# Patient Record
Sex: FEMALE | Race: WHITE | NOT HISPANIC OR LATINO
[De-identification: names, ages, dates, MRNs, and addresses within clinical notes are randomized per-mention and may not be internally consistent; named-entity substitution may affect disease eponyms.]

---

## 2017-07-20 NOTE — H&P PST ADULT - HISTORY OF PRESENT ILLNESS
36 yo female  LMP 15 presented for elective D&C due to missed  Pt presented to PMD office where sono noted no sac or fetal heart rate

## 2017-07-21 ENCOUNTER — RESULT REVIEW (OUTPATIENT)
Age: 37
End: 2017-07-21

## 2017-07-21 ENCOUNTER — TRANSCRIPTION ENCOUNTER (OUTPATIENT)
Age: 37
End: 2017-07-21

## 2017-07-21 ENCOUNTER — OUTPATIENT (OUTPATIENT)
Dept: OUTPATIENT SERVICES | Facility: HOSPITAL | Age: 37
LOS: 1 days | Discharge: HOME | End: 2017-07-21
Payer: MEDICAID

## 2017-07-21 VITALS
HEART RATE: 77 BPM | RESPIRATION RATE: 16 BRPM | OXYGEN SATURATION: 96 % | SYSTOLIC BLOOD PRESSURE: 100 MMHG | DIASTOLIC BLOOD PRESSURE: 60 MMHG

## 2017-07-21 VITALS
DIASTOLIC BLOOD PRESSURE: 78 MMHG | TEMPERATURE: 98 F | HEART RATE: 69 BPM | WEIGHT: 229.94 LBS | HEIGHT: 66 IN | SYSTOLIC BLOOD PRESSURE: 105 MMHG | OXYGEN SATURATION: 98 % | RESPIRATION RATE: 19 BRPM

## 2017-07-21 DIAGNOSIS — O02.1 MISSED ABORTION: ICD-10-CM

## 2017-07-21 DIAGNOSIS — Z98.89 OTHER SPECIFIED POSTPROCEDURAL STATES: Chronic | ICD-10-CM

## 2017-07-21 DIAGNOSIS — Z87.39 PERSONAL HISTORY OF OTHER DISEASES OF THE MUSCULOSKELETAL SYSTEM AND CONNECTIVE TISSUE: Chronic | ICD-10-CM

## 2017-07-21 DIAGNOSIS — Z98.891 HISTORY OF UTERINE SCAR FROM PREVIOUS SURGERY: Chronic | ICD-10-CM

## 2017-07-21 LAB
BLD GP AB SCN SERPL QL: SIGNIFICANT CHANGE UP
HCT VFR BLD CALC: 40.1 % — SIGNIFICANT CHANGE UP (ref 34.5–45)
HGB BLD-MCNC: 13.5 G/DL — SIGNIFICANT CHANGE UP (ref 11.5–15.5)
MCHC RBC-ENTMCNC: 29.6 PG — SIGNIFICANT CHANGE UP (ref 27–34)
MCHC RBC-ENTMCNC: 33.6 GM/DL — SIGNIFICANT CHANGE UP (ref 32–36)
MCV RBC AUTO: 88.3 FL — SIGNIFICANT CHANGE UP (ref 80–100)
PLATELET # BLD AUTO: 418 K/UL — HIGH (ref 150–400)
RBC # BLD: 4.54 M/UL — SIGNIFICANT CHANGE UP (ref 3.8–5.2)
RBC # FLD: 12.1 % — SIGNIFICANT CHANGE UP (ref 10.3–14.5)
WBC # BLD: 10.6 K/UL — HIGH (ref 3.8–10.5)
WBC # FLD AUTO: 10.6 K/UL — HIGH (ref 3.8–10.5)

## 2017-07-21 PROCEDURE — 88305 TISSUE EXAM BY PATHOLOGIST: CPT

## 2017-07-21 PROCEDURE — 86850 RBC ANTIBODY SCREEN: CPT

## 2017-07-21 PROCEDURE — 88300 SURGICAL PATH GROSS: CPT

## 2017-07-21 PROCEDURE — 88300 SURGICAL PATH GROSS: CPT | Mod: 26,59

## 2017-07-21 PROCEDURE — 86900 BLOOD TYPING SEROLOGIC ABO: CPT

## 2017-07-21 PROCEDURE — 86901 BLOOD TYPING SEROLOGIC RH(D): CPT

## 2017-07-21 PROCEDURE — 59820 CARE OF MISCARRIAGE: CPT

## 2017-07-21 PROCEDURE — 88305 TISSUE EXAM BY PATHOLOGIST: CPT | Mod: 26

## 2017-07-21 PROCEDURE — 85027 COMPLETE CBC AUTOMATED: CPT

## 2017-07-21 RX ORDER — SODIUM CHLORIDE 9 MG/ML
1000 INJECTION, SOLUTION INTRAVENOUS
Qty: 0 | Refills: 0 | Status: DISCONTINUED | OUTPATIENT
Start: 2017-07-21 | End: 2017-07-21

## 2017-07-21 RX ORDER — ACETAMINOPHEN 500 MG
650 TABLET ORAL ONCE
Qty: 0 | Refills: 0 | Status: DISCONTINUED | OUTPATIENT
Start: 2017-07-21 | End: 2017-07-21

## 2017-07-21 RX ORDER — OXYCODONE HYDROCHLORIDE 5 MG/1
5 TABLET ORAL EVERY 4 HOURS
Qty: 0 | Refills: 0 | Status: DISCONTINUED | OUTPATIENT
Start: 2017-07-21 | End: 2017-07-21

## 2017-07-21 RX ORDER — HYDROMORPHONE HYDROCHLORIDE 2 MG/ML
0.5 INJECTION INTRAMUSCULAR; INTRAVENOUS; SUBCUTANEOUS
Qty: 0 | Refills: 0 | Status: DISCONTINUED | OUTPATIENT
Start: 2017-07-21 | End: 2017-07-21

## 2017-07-21 RX ADMIN — HYDROMORPHONE HYDROCHLORIDE 0.5 MILLIGRAM(S): 2 INJECTION INTRAMUSCULAR; INTRAVENOUS; SUBCUTANEOUS at 14:07

## 2017-07-21 RX ADMIN — HYDROMORPHONE HYDROCHLORIDE 0.5 MILLIGRAM(S): 2 INJECTION INTRAMUSCULAR; INTRAVENOUS; SUBCUTANEOUS at 12:57

## 2017-07-21 NOTE — ASU DISCHARGE PLAN (ADULT/PEDIATRIC). - NOTIFY
Bleeding that does not stop/Inability to Tolerate Liquids or Foods/Pain not relieved by Medications/Persistent Nausea and Vomiting/Unable to Urinate/Excessive Diarrhea/GYN Fever>100.4

## 2017-07-21 NOTE — BRIEF OPERATIVE NOTE - PROCEDURE
Karyotyping of products of conception (nominal result)  2017    Active  [id number]  D&C, therapeutic, for incomp, missed, septic, or induced , 1st trimester  2017    [facility]  [facility]

## 2017-07-21 NOTE — ASU DISCHARGE PLAN (ADULT/PEDIATRIC). - INSTRUCTIONS
****Call the office with any problems including but not limited to heavy vaginal bleeding, fevers, severe abdominal pain, inability to eat/drink/urinate  **** Nothing in the vagina x2 weeks-( No sex, tampons, douching )  *****You may shower as usual but no hot tubs, bath tubs, swimming pools x2 weeks.  for urgent post op care please contact Ayaka at Dr. Isaacs's surgical hotline  [phone]

## 2017-07-21 NOTE — ASU DISCHARGE PLAN (ADULT/PEDIATRIC). - MEDICATION SUMMARY - MEDICATIONS TO TAKE
I will START or STAY ON the medications listed below when I get home from the hospital:    ibuprofen 200 mg oral tablet  -- 3 tab(s) by mouth every 6 hours, As Needed  -- Indication: For pain med    naproxen 250 mg oral tablet  -- 1 tab(s) by mouth 2 times a day, As Needed  -- Indication: For pain med    Prenatal 1 oral capsule  -- 1 tab(s) by mouth once a day  -- Indication: For Home med

## 2017-07-21 NOTE — ASU DISCHARGE PLAN (ADULT/PEDIATRIC). - ACTIVITY LEVEL
no heavy lifting/no sports/gym/no exercise/no tampons/no intercourse/no douching/no tub baths/nothing per vagina

## 2017-07-24 LAB
CHROM ANALY OVERALL INTERP SPEC-IMP: SIGNIFICANT CHANGE UP
SURGICAL PATHOLOGY FINAL REPORT - CH: SIGNIFICANT CHANGE UP

## 2017-07-25 DIAGNOSIS — O02.1 MISSED ABORTION: ICD-10-CM

## 2017-07-25 DIAGNOSIS — Z87.891 PERSONAL HISTORY OF NICOTINE DEPENDENCE: ICD-10-CM

## 2017-07-25 DIAGNOSIS — E66.9 OBESITY, UNSPECIFIED: ICD-10-CM

## 2017-11-15 ENCOUNTER — APPOINTMENT (OUTPATIENT)
Dept: ANTEPARTUM | Facility: CLINIC | Age: 37
End: 2017-11-15
Payer: MEDICAID

## 2017-11-15 ENCOUNTER — ASOB RESULT (OUTPATIENT)
Age: 37
End: 2017-11-15

## 2017-11-15 PROCEDURE — 36416 COLLJ CAPILLARY BLOOD SPEC: CPT

## 2017-11-15 PROCEDURE — 76801 OB US < 14 WKS SINGLE FETUS: CPT

## 2017-11-15 PROCEDURE — 76813 OB US NUCHAL MEAS 1 GEST: CPT

## 2018-01-09 ENCOUNTER — APPOINTMENT (OUTPATIENT)
Dept: ANTEPARTUM | Facility: CLINIC | Age: 38
End: 2018-01-09
Payer: MEDICAID

## 2018-01-09 ENCOUNTER — ASOB RESULT (OUTPATIENT)
Age: 38
End: 2018-01-09

## 2018-01-09 PROCEDURE — 99241 OFFICE CONSULTATION NEW/ESTAB PATIENT 15 MIN: CPT | Mod: 25

## 2018-01-09 PROCEDURE — 76811 OB US DETAILED SNGL FETUS: CPT

## 2018-01-19 ENCOUNTER — APPOINTMENT (OUTPATIENT)
Dept: ANTEPARTUM | Facility: CLINIC | Age: 38
End: 2018-01-19

## 2018-05-01 ENCOUNTER — TRANSCRIPTION ENCOUNTER (OUTPATIENT)
Age: 38
End: 2018-05-01

## 2018-05-02 ENCOUNTER — TRANSCRIPTION ENCOUNTER (OUTPATIENT)
Age: 38
End: 2018-05-02

## 2018-05-02 ENCOUNTER — INPATIENT (INPATIENT)
Facility: HOSPITAL | Age: 38
LOS: 2 days | Discharge: HOME | End: 2018-05-05
Attending: OBSTETRICS & GYNECOLOGY | Admitting: OBSTETRICS & GYNECOLOGY
Payer: MEDICAID

## 2018-05-02 ENCOUNTER — RESULT REVIEW (OUTPATIENT)
Age: 38
End: 2018-05-02

## 2018-05-02 VITALS — WEIGHT: 259.93 LBS | HEIGHT: 66 IN

## 2018-05-02 DIAGNOSIS — Z98.89 OTHER SPECIFIED POSTPROCEDURAL STATES: Chronic | ICD-10-CM

## 2018-05-02 DIAGNOSIS — Z87.39 PERSONAL HISTORY OF OTHER DISEASES OF THE MUSCULOSKELETAL SYSTEM AND CONNECTIVE TISSUE: Chronic | ICD-10-CM

## 2018-05-02 DIAGNOSIS — Z3A.00 WEEKS OF GESTATION OF PREGNANCY NOT SPECIFIED: ICD-10-CM

## 2018-05-02 DIAGNOSIS — O42.10 PREMATURE RUPTURE OF MEMBRANES, ONSET OF LABOR MORE THAN 24 HOURS FOLLOWING RUPTURE, UNSPECIFIED WEEKS OF GESTATION: ICD-10-CM

## 2018-05-02 DIAGNOSIS — Z98.891 HISTORY OF UTERINE SCAR FROM PREVIOUS SURGERY: Chronic | ICD-10-CM

## 2018-05-02 LAB
BLD GP AB SCN SERPL QL: NEGATIVE — SIGNIFICANT CHANGE UP
HCT VFR BLD CALC: 34.7 % — SIGNIFICANT CHANGE UP (ref 34.5–45)
HGB BLD-MCNC: 11 G/DL — LOW (ref 11.5–15.5)
MCHC RBC-ENTMCNC: 28.6 PG — SIGNIFICANT CHANGE UP (ref 27–34)
MCHC RBC-ENTMCNC: 31.7 % — LOW (ref 32–36)
MCV RBC AUTO: 90.4 FL — SIGNIFICANT CHANGE UP (ref 80–100)
NRBC # FLD: 0 — SIGNIFICANT CHANGE UP
PLATELET # BLD AUTO: 297 K/UL — SIGNIFICANT CHANGE UP (ref 150–400)
PMV BLD: 11 FL — SIGNIFICANT CHANGE UP (ref 7–13)
RBC # BLD: 3.84 M/UL — SIGNIFICANT CHANGE UP (ref 3.8–5.2)
RBC # FLD: 14.3 % — SIGNIFICANT CHANGE UP (ref 10.3–14.5)
RH IG SCN BLD-IMP: POSITIVE — SIGNIFICANT CHANGE UP
T PALLIDUM AB TITR SER: NEGATIVE — SIGNIFICANT CHANGE UP
WBC # BLD: 13.61 K/UL — HIGH (ref 3.8–10.5)
WBC # FLD AUTO: 13.61 K/UL — HIGH (ref 3.8–10.5)

## 2018-05-02 PROCEDURE — 59514 CESAREAN DELIVERY ONLY: CPT | Mod: AS,U8

## 2018-05-02 PROCEDURE — 88302 TISSUE EXAM BY PATHOLOGIST: CPT | Mod: 26

## 2018-05-02 RX ORDER — GLYCERIN ADULT
1 SUPPOSITORY, RECTAL RECTAL AT BEDTIME
Qty: 0 | Refills: 0 | Status: DISCONTINUED | OUTPATIENT
Start: 2018-05-02 | End: 2018-05-05

## 2018-05-02 RX ORDER — FAMOTIDINE 10 MG/ML
20 INJECTION INTRAVENOUS ONCE
Qty: 0 | Refills: 0 | Status: COMPLETED | OUTPATIENT
Start: 2018-05-02 | End: 2018-05-02

## 2018-05-02 RX ORDER — ACETAMINOPHEN 500 MG
975 TABLET ORAL EVERY 6 HOURS
Qty: 0 | Refills: 0 | Status: DISCONTINUED | OUTPATIENT
Start: 2018-05-02 | End: 2018-05-02

## 2018-05-02 RX ORDER — OXYCODONE HYDROCHLORIDE 5 MG/1
5 TABLET ORAL
Qty: 0 | Refills: 0 | Status: DISCONTINUED | OUTPATIENT
Start: 2018-05-02 | End: 2018-05-03

## 2018-05-02 RX ORDER — HYDROMORPHONE HYDROCHLORIDE 2 MG/ML
1 INJECTION INTRAMUSCULAR; INTRAVENOUS; SUBCUTANEOUS
Qty: 0 | Refills: 0 | Status: DISCONTINUED | OUTPATIENT
Start: 2018-05-02 | End: 2018-05-03

## 2018-05-02 RX ORDER — DOCUSATE SODIUM 100 MG
100 CAPSULE ORAL
Qty: 0 | Refills: 0 | Status: DISCONTINUED | OUTPATIENT
Start: 2018-05-02 | End: 2018-05-05

## 2018-05-02 RX ORDER — OXYTOCIN 10 UNIT/ML
333.33 VIAL (ML) INJECTION
Qty: 20 | Refills: 0 | Status: DISCONTINUED | OUTPATIENT
Start: 2018-05-02 | End: 2018-05-02

## 2018-05-02 RX ORDER — SODIUM CHLORIDE 9 MG/ML
1000 INJECTION, SOLUTION INTRAVENOUS
Qty: 0 | Refills: 0 | Status: DISCONTINUED | OUTPATIENT
Start: 2018-05-02 | End: 2018-05-03

## 2018-05-02 RX ORDER — SIMETHICONE 80 MG/1
80 TABLET, CHEWABLE ORAL EVERY 4 HOURS
Qty: 0 | Refills: 0 | Status: DISCONTINUED | OUTPATIENT
Start: 2018-05-02 | End: 2018-05-05

## 2018-05-02 RX ORDER — OXYTOCIN 10 UNIT/ML
41.67 VIAL (ML) INJECTION
Qty: 20 | Refills: 0 | Status: DISCONTINUED | OUTPATIENT
Start: 2018-05-02 | End: 2018-05-02

## 2018-05-02 RX ORDER — KETOROLAC TROMETHAMINE 30 MG/ML
30 SYRINGE (ML) INJECTION EVERY 6 HOURS
Qty: 0 | Refills: 0 | Status: DISCONTINUED | OUTPATIENT
Start: 2018-05-02 | End: 2018-05-03

## 2018-05-02 RX ORDER — LANOLIN
1 OINTMENT (GRAM) TOPICAL
Qty: 0 | Refills: 0 | Status: DISCONTINUED | OUTPATIENT
Start: 2018-05-02 | End: 2018-05-05

## 2018-05-02 RX ORDER — FERROUS SULFATE 325(65) MG
325 TABLET ORAL DAILY
Qty: 0 | Refills: 0 | Status: DISCONTINUED | OUTPATIENT
Start: 2018-05-02 | End: 2018-05-05

## 2018-05-02 RX ORDER — CITRIC ACID/SODIUM CITRATE 300-500 MG
30 SOLUTION, ORAL ORAL ONCE
Qty: 0 | Refills: 0 | Status: COMPLETED | OUTPATIENT
Start: 2018-05-02 | End: 2018-05-02

## 2018-05-02 RX ORDER — OXYCODONE HYDROCHLORIDE 5 MG/1
10 TABLET ORAL
Qty: 0 | Refills: 0 | Status: DISCONTINUED | OUTPATIENT
Start: 2018-05-02 | End: 2018-05-03

## 2018-05-02 RX ORDER — MORPHINE SULFATE 50 MG/1
0.2 CAPSULE, EXTENDED RELEASE ORAL ONCE
Qty: 0 | Refills: 0 | Status: DISCONTINUED | OUTPATIENT
Start: 2018-05-02 | End: 2018-05-03

## 2018-05-02 RX ORDER — SODIUM CHLORIDE 9 MG/ML
1000 INJECTION, SOLUTION INTRAVENOUS ONCE
Qty: 0 | Refills: 0 | Status: COMPLETED | OUTPATIENT
Start: 2018-05-02 | End: 2018-05-02

## 2018-05-02 RX ORDER — METOCLOPRAMIDE HCL 10 MG
10 TABLET ORAL ONCE
Qty: 0 | Refills: 0 | Status: COMPLETED | OUTPATIENT
Start: 2018-05-02 | End: 2018-05-02

## 2018-05-02 RX ORDER — DIPHENHYDRAMINE HCL 50 MG
25 CAPSULE ORAL EVERY 6 HOURS
Qty: 0 | Refills: 0 | Status: DISCONTINUED | OUTPATIENT
Start: 2018-05-02 | End: 2018-05-05

## 2018-05-02 RX ORDER — NALOXONE HYDROCHLORIDE 4 MG/.1ML
0.1 SPRAY NASAL
Qty: 0 | Refills: 0 | Status: DISCONTINUED | OUTPATIENT
Start: 2018-05-02 | End: 2018-05-03

## 2018-05-02 RX ORDER — TETANUS TOXOID, REDUCED DIPHTHERIA TOXOID AND ACELLULAR PERTUSSIS VACCINE, ADSORBED 5; 2.5; 8; 8; 2.5 [IU]/.5ML; [IU]/.5ML; UG/.5ML; UG/.5ML; UG/.5ML
0.5 SUSPENSION INTRAMUSCULAR ONCE
Qty: 0 | Refills: 0 | Status: DISCONTINUED | OUTPATIENT
Start: 2018-05-02 | End: 2018-05-05

## 2018-05-02 RX ORDER — OXYTOCIN 10 UNIT/ML
41.67 VIAL (ML) INJECTION
Qty: 20 | Refills: 0 | Status: DISCONTINUED | OUTPATIENT
Start: 2018-05-02 | End: 2018-05-03

## 2018-05-02 RX ORDER — DIPHENHYDRAMINE HCL 50 MG
25 CAPSULE ORAL EVERY 4 HOURS
Qty: 0 | Refills: 0 | Status: DISCONTINUED | OUTPATIENT
Start: 2018-05-02 | End: 2018-05-03

## 2018-05-02 RX ORDER — IBUPROFEN 200 MG
600 TABLET ORAL EVERY 6 HOURS
Qty: 0 | Refills: 0 | Status: DISCONTINUED | OUTPATIENT
Start: 2018-05-02 | End: 2018-05-03

## 2018-05-02 RX ORDER — KETOROLAC TROMETHAMINE 30 MG/ML
30 SYRINGE (ML) INJECTION EVERY 6 HOURS
Qty: 0 | Refills: 0 | Status: DISCONTINUED | OUTPATIENT
Start: 2018-05-02 | End: 2018-05-02

## 2018-05-02 RX ORDER — SODIUM CHLORIDE 9 MG/ML
1000 INJECTION, SOLUTION INTRAVENOUS
Qty: 0 | Refills: 0 | Status: DISCONTINUED | OUTPATIENT
Start: 2018-05-02 | End: 2018-05-02

## 2018-05-02 RX ORDER — ACETAMINOPHEN 500 MG
975 TABLET ORAL EVERY 6 HOURS
Qty: 0 | Refills: 0 | Status: DISCONTINUED | OUTPATIENT
Start: 2018-05-02 | End: 2018-05-03

## 2018-05-02 RX ORDER — HYDROMORPHONE HYDROCHLORIDE 2 MG/ML
0.5 INJECTION INTRAMUSCULAR; INTRAVENOUS; SUBCUTANEOUS
Qty: 0 | Refills: 0 | Status: DISCONTINUED | OUTPATIENT
Start: 2018-05-02 | End: 2018-05-03

## 2018-05-02 RX ORDER — HEPARIN SODIUM 5000 [USP'U]/ML
5000 INJECTION INTRAVENOUS; SUBCUTANEOUS EVERY 12 HOURS
Qty: 0 | Refills: 0 | Status: DISCONTINUED | OUTPATIENT
Start: 2018-05-02 | End: 2018-05-05

## 2018-05-02 RX ORDER — IBUPROFEN 200 MG
600 TABLET ORAL EVERY 6 HOURS
Qty: 0 | Refills: 0 | Status: DISCONTINUED | OUTPATIENT
Start: 2018-05-02 | End: 2018-05-02

## 2018-05-02 RX ORDER — ONDANSETRON 8 MG/1
4 TABLET, FILM COATED ORAL EVERY 6 HOURS
Qty: 0 | Refills: 0 | Status: DISCONTINUED | OUTPATIENT
Start: 2018-05-02 | End: 2018-05-03

## 2018-05-02 RX ADMIN — Medication 30 MILLILITER(S): at 08:00

## 2018-05-02 RX ADMIN — Medication 30 MILLIGRAM(S): at 15:03

## 2018-05-02 RX ADMIN — SODIUM CHLORIDE 125 MILLILITER(S): 9 INJECTION, SOLUTION INTRAVENOUS at 07:22

## 2018-05-02 RX ADMIN — Medication 30 MILLIGRAM(S): at 15:33

## 2018-05-02 RX ADMIN — Medication 975 MILLIGRAM(S): at 21:53

## 2018-05-02 RX ADMIN — Medication 125 MILLIUNIT(S)/MIN: at 09:53

## 2018-05-02 RX ADMIN — SIMETHICONE 80 MILLIGRAM(S): 80 TABLET, CHEWABLE ORAL at 21:53

## 2018-05-02 RX ADMIN — HEPARIN SODIUM 5000 UNIT(S): 5000 INJECTION INTRAVENOUS; SUBCUTANEOUS at 15:03

## 2018-05-02 RX ADMIN — Medication 30 MILLIGRAM(S): at 21:52

## 2018-05-02 RX ADMIN — SODIUM CHLORIDE 75 MILLILITER(S): 9 INJECTION, SOLUTION INTRAVENOUS at 09:50

## 2018-05-02 RX ADMIN — SODIUM CHLORIDE 125 MILLILITER(S): 9 INJECTION, SOLUTION INTRAVENOUS at 01:40

## 2018-05-02 RX ADMIN — Medication 100 MILLIGRAM(S): at 21:53

## 2018-05-02 RX ADMIN — Medication 12 MILLIGRAM(S): at 01:32

## 2018-05-02 RX ADMIN — SODIUM CHLORIDE 125 MILLILITER(S): 9 INJECTION, SOLUTION INTRAVENOUS at 07:01

## 2018-05-02 RX ADMIN — Medication 30 MILLIGRAM(S): at 22:20

## 2018-05-02 RX ADMIN — SODIUM CHLORIDE 2000 MILLILITER(S): 9 INJECTION, SOLUTION INTRAVENOUS at 06:55

## 2018-05-02 RX ADMIN — Medication 10 MILLIGRAM(S): at 07:22

## 2018-05-02 RX ADMIN — FAMOTIDINE 20 MILLIGRAM(S): 10 INJECTION INTRAVENOUS at 07:22

## 2018-05-02 NOTE — DISCHARGE NOTE OB - PATIENT PORTAL LINK FT
You can access the [facility], offered by [facility], by registering with the following website: [email or web address]

## 2018-05-02 NOTE — DISCHARGE NOTE OB - CARE PROVIDER_API CALL
Kayli Portillo), Gynecology Obstetrics  Gynecology  [address] [place] [address]  Phone: [phone]  Fax: [phone]

## 2018-05-02 NOTE — DISCHARGE NOTE OB - MATERIALS PROVIDED
Guide to Postpartum Care/Shaken Baby Prevention Handout/[facility] Hearing Screen Program/  Immunization Record/Breastfeeding Log/Vaccinations/[facility] [place] Screening Program/Birth Certificate Instructions

## 2018-05-02 NOTE — DISCHARGE NOTE OB - CARE PLAN
Principal Discharge DX:	 delivery delivered  Goal:	Routine postop care  Assessment and plan of treatment:	Regular diet  Activity ad ilene

## 2018-05-02 NOTE — DISCHARGE NOTE OB - MEDICATION SUMMARY - MEDICATIONS TO STOP TAKING
I will STOP taking the medications listed below when I get home from the hospital:  None I will STOP taking the medications listed below when I get home from the hospital:    naproxen 250 mg oral tablet  -- 1 tab(s) by mouth 2 times a day, As Needed

## 2018-05-03 LAB
HCT VFR BLD CALC: 30.7 % — LOW (ref 34.5–45)
HGB BLD-MCNC: 9.6 G/DL — LOW (ref 11.5–15.5)
MCHC RBC-ENTMCNC: 28.9 PG — SIGNIFICANT CHANGE UP (ref 27–34)
MCHC RBC-ENTMCNC: 31.3 % — LOW (ref 32–36)
MCV RBC AUTO: 92.5 FL — SIGNIFICANT CHANGE UP (ref 80–100)
NRBC # FLD: 0 — SIGNIFICANT CHANGE UP
PLATELET # BLD AUTO: 288 K/UL — SIGNIFICANT CHANGE UP (ref 150–400)
PMV BLD: 11 FL — SIGNIFICANT CHANGE UP (ref 7–13)
RBC # BLD: 3.32 M/UL — LOW (ref 3.8–5.2)
RBC # FLD: 14.4 % — SIGNIFICANT CHANGE UP (ref 10.3–14.5)
WBC # BLD: 16.38 K/UL — HIGH (ref 3.8–10.5)
WBC # FLD AUTO: 16.38 K/UL — HIGH (ref 3.8–10.5)

## 2018-05-03 RX ORDER — IBUPROFEN 200 MG
600 TABLET ORAL EVERY 6 HOURS
Qty: 0 | Refills: 0 | Status: COMPLETED | OUTPATIENT
Start: 2018-05-03 | End: 2019-04-01

## 2018-05-03 RX ORDER — OXYCODONE HYDROCHLORIDE 5 MG/1
5 TABLET ORAL EVERY 4 HOURS
Qty: 0 | Refills: 0 | Status: DISCONTINUED | OUTPATIENT
Start: 2018-05-03 | End: 2018-05-05

## 2018-05-03 RX ORDER — ACETAMINOPHEN 500 MG
975 TABLET ORAL EVERY 6 HOURS
Qty: 0 | Refills: 0 | Status: DISCONTINUED | OUTPATIENT
Start: 2018-05-03 | End: 2018-05-05

## 2018-05-03 RX ORDER — IBUPROFEN 200 MG
600 TABLET ORAL EVERY 6 HOURS
Qty: 0 | Refills: 0 | Status: DISCONTINUED | OUTPATIENT
Start: 2018-05-03 | End: 2018-05-05

## 2018-05-03 RX ORDER — OXYCODONE HYDROCHLORIDE 5 MG/1
5 TABLET ORAL
Qty: 0 | Refills: 0 | Status: COMPLETED | OUTPATIENT
Start: 2018-05-03 | End: 2018-05-10

## 2018-05-03 RX ADMIN — SIMETHICONE 80 MILLIGRAM(S): 80 TABLET, CHEWABLE ORAL at 21:16

## 2018-05-03 RX ADMIN — Medication 30 MILLIGRAM(S): at 03:55

## 2018-05-03 RX ADMIN — Medication 975 MILLIGRAM(S): at 16:04

## 2018-05-03 RX ADMIN — Medication 30 MILLIGRAM(S): at 03:25

## 2018-05-03 RX ADMIN — Medication 325 MILLIGRAM(S): at 15:35

## 2018-05-03 RX ADMIN — Medication 600 MILLIGRAM(S): at 21:16

## 2018-05-03 RX ADMIN — Medication 975 MILLIGRAM(S): at 15:34

## 2018-05-03 RX ADMIN — HEPARIN SODIUM 5000 UNIT(S): 5000 INJECTION INTRAVENOUS; SUBCUTANEOUS at 03:25

## 2018-05-03 RX ADMIN — Medication 975 MILLIGRAM(S): at 09:18

## 2018-05-03 RX ADMIN — Medication 30 MILLIGRAM(S): at 08:48

## 2018-05-03 RX ADMIN — Medication 975 MILLIGRAM(S): at 21:45

## 2018-05-03 RX ADMIN — Medication 30 MILLIGRAM(S): at 09:18

## 2018-05-03 RX ADMIN — SIMETHICONE 80 MILLIGRAM(S): 80 TABLET, CHEWABLE ORAL at 05:48

## 2018-05-03 RX ADMIN — Medication 600 MILLIGRAM(S): at 21:45

## 2018-05-03 RX ADMIN — Medication 100 MILLIGRAM(S): at 05:48

## 2018-05-03 RX ADMIN — HEPARIN SODIUM 5000 UNIT(S): 5000 INJECTION INTRAVENOUS; SUBCUTANEOUS at 15:35

## 2018-05-03 RX ADMIN — Medication 975 MILLIGRAM(S): at 21:16

## 2018-05-03 RX ADMIN — Medication 100 MILLIGRAM(S): at 21:16

## 2018-05-03 RX ADMIN — Medication 1 TABLET(S): at 15:35

## 2018-05-03 RX ADMIN — Medication 600 MILLIGRAM(S): at 16:04

## 2018-05-03 RX ADMIN — Medication 600 MILLIGRAM(S): at 15:34

## 2018-05-03 RX ADMIN — Medication 975 MILLIGRAM(S): at 03:25

## 2018-05-03 RX ADMIN — Medication 975 MILLIGRAM(S): at 03:55

## 2018-05-03 RX ADMIN — Medication 975 MILLIGRAM(S): at 08:48

## 2018-05-03 RX ADMIN — SODIUM CHLORIDE 125 MILLILITER(S): 9 INJECTION, SOLUTION INTRAVENOUS at 08:26

## 2018-05-03 NOTE — LACTATION INITIAL EVALUATION - INTERVENTION OUTCOME
Offered support and encouraged pt to hand express/use double electric breastpump every 2 to 3 hours for  in NICU. Reviewed duration and frequency of pumping sessions. Safe collection, handling and storage of breastmilk discussed. Proper cleansing of breastpump parts reviewed.

## 2018-05-03 NOTE — PROGRESS NOTE ADULT - SUBJECTIVE AND OBJECTIVE BOX
Patient seen and examined at bedside, no acute overnight events. No acute complaints, pain well controlled. Patient is ambulating and tolerating regular diet. Has not yet passed flatus or had BM. Barrientos is still in place.     Vital Signs Last 24 Hours  T(C): 36.8 (05-03-18 @ 06:22), Max: 36.8 (05-03-18 @ 06:22)  HR: 73 (05-03-18 @ 06:22) (65 - 84)  BP: 98/62 (05-03-18 @ 06:22) (95/51 - 123/68)  RR: 16 (05-03-18 @ 06:22) (12 - 24)  SpO2: 100% (05-03-18 @ 06:22) (96% - 100%)    I&O's Summary    02 May 2018 07:01  -  03 May 2018 07:00  --------------------------------------------------------  IN: 1350 mL / OUT: 3017 mL / NET: -1667 mL        Physical Exam:  General: NAD  Abdomen: soft, appropriately tender, non-distended, fundus firm  Incision: Pfannenstiel incision CDI, no drainage, no erythema, subcuticular suture closure, dermabond intact  Pelvic: lochia wnl    Labs:  Blood Type: B Positive  Antibody Screen: Negative  RPR: Negative               11.0   13.61 )-----------( 297      ( 05-02 @ 01:18 )             34.7         MEDICATIONS  (STANDING):  acetaminophen   Tablet. 975 milliGRAM(s) Oral every 6 hours  diphtheria/tetanus/pertussis (acellular) Vaccine (ADAcel) 0.5 milliLiter(s) IntraMuscular once  ferrous    sulfate 325 milliGRAM(s) Oral daily  heparin  Injectable 5000 Unit(s) SubCutaneous every 12 hours  ibuprofen  Tablet 600 milliGRAM(s) Oral every 6 hours  ketorolac   Injectable 30 milliGRAM(s) IV Push every 6 hours  lactated ringers. 1000 milliLiter(s) (125 mL/Hr) IV Continuous <Continuous>  oxyCODONE    IR 5 milliGRAM(s) Oral every 3 hours  prenatal multivitamin 1 Tablet(s) Oral daily    MEDICATIONS  (PRN):  diphenhydrAMINE   Capsule 25 milliGRAM(s) Oral every 6 hours PRN Itching  diphenhydrAMINE   Injectable 25 milliGRAM(s) IV Push every 4 hours PRN Pruritus  docusate sodium 100 milliGRAM(s) Oral two times a day PRN Stool Softening  glycerin Suppository - Adult 1 Suppository(s) Rectal at bedtime PRN Constipation  HYDROmorphone  Injectable 1 milliGRAM(s) IV Push every 3 hours PRN Severe Pain  lanolin Ointment 1 Application(s) Topical every 3 hours PRN Sore Nipples  naloxone Injectable 0.1 milliGRAM(s) IV Push every 3 minutes PRN For ANY of the following changes in patient status:  A. RR LESS THAN 10 breaths per minute, B. Oxygen saturation LESS THAN 90%, C. Sedation score of 6  ondansetron Injectable 4 milliGRAM(s) IV Push every 6 hours PRN Nausea  oxyCODONE    IR 5 milliGRAM(s) Oral every 3 hours PRN Mild Pain  oxyCODONE    IR 10 milliGRAM(s) Oral every 3 hours PRN Moderate Pain  oxyCODONE    IR 5 milliGRAM(s) Oral every 4 hours PRN Severe Pain (7 - 10)  simethicone 80 milliGRAM(s) Chew every 4 hours PRN Gas

## 2018-05-03 NOTE — PROGRESS NOTE ADULT - SUBJECTIVE AND OBJECTIVE BOX
INTERVAL HPI/OVERNIGHT EVENTS:  38y Female s/p c section under spinal anesthesia with duramorph for post op analgesia on     Vital Signs Last 24 Hrs  T(C): 36.8 (03 May 2018 06:22), Max: 36.8 (03 May 2018 06:22)  T(F): 98.2 (03 May 2018 06:22), Max: 98.2 (03 May 2018 06:22)  HR: 73 (03 May 2018 06:22) (65 - 84)  BP: 98/62 (03 May 2018 06:22) (95/51 - 123/68)  BP(mean): 66 (02 May 2018 12:00) (62 - 98)  RR: 16 (03 May 2018 06:22) (16 - 23)  SpO2: 100% (03 May 2018 06:22) (96% - 100%)    Patient seen, doing well, no anesthetic complications or complaints noted or reported.  Pain is controlled.    Seen 09:01 am.  Sitting up in bed smiling.  Nurse prepping IV site for a shower.  No issues.

## 2018-05-03 NOTE — PROGRESS NOTE ADULT - PROBLEM SELECTOR PLAN 1
- check CBC  - continue with PO analgesia  - increase ambulation  - continue regular diet  - encourage incentive spirometry  - d/c IV fluids  - d/c kandy Quiñones MD PGY1

## 2018-05-04 RX ORDER — OXYCODONE HYDROCHLORIDE 5 MG/1
5 TABLET ORAL
Qty: 0 | Refills: 0 | Status: DISCONTINUED | OUTPATIENT
Start: 2018-05-04 | End: 2018-05-05

## 2018-05-04 RX ADMIN — Medication 600 MILLIGRAM(S): at 22:00

## 2018-05-04 RX ADMIN — HEPARIN SODIUM 5000 UNIT(S): 5000 INJECTION INTRAVENOUS; SUBCUTANEOUS at 02:24

## 2018-05-04 RX ADMIN — HEPARIN SODIUM 5000 UNIT(S): 5000 INJECTION INTRAVENOUS; SUBCUTANEOUS at 14:39

## 2018-05-04 RX ADMIN — Medication 600 MILLIGRAM(S): at 14:50

## 2018-05-04 RX ADMIN — Medication 100 MILLIGRAM(S): at 06:33

## 2018-05-04 RX ADMIN — Medication 975 MILLIGRAM(S): at 02:24

## 2018-05-04 RX ADMIN — Medication 600 MILLIGRAM(S): at 14:39

## 2018-05-04 RX ADMIN — Medication 325 MILLIGRAM(S): at 12:30

## 2018-05-04 RX ADMIN — Medication 600 MILLIGRAM(S): at 02:24

## 2018-05-04 RX ADMIN — Medication 600 MILLIGRAM(S): at 02:50

## 2018-05-04 RX ADMIN — Medication 975 MILLIGRAM(S): at 09:44

## 2018-05-04 RX ADMIN — Medication 975 MILLIGRAM(S): at 10:00

## 2018-05-04 RX ADMIN — Medication 975 MILLIGRAM(S): at 23:00

## 2018-05-04 RX ADMIN — SIMETHICONE 80 MILLIGRAM(S): 80 TABLET, CHEWABLE ORAL at 14:39

## 2018-05-04 RX ADMIN — Medication 975 MILLIGRAM(S): at 14:50

## 2018-05-04 RX ADMIN — Medication 600 MILLIGRAM(S): at 09:44

## 2018-05-04 RX ADMIN — Medication 600 MILLIGRAM(S): at 10:00

## 2018-05-04 RX ADMIN — Medication 600 MILLIGRAM(S): at 23:00

## 2018-05-04 RX ADMIN — Medication 975 MILLIGRAM(S): at 02:50

## 2018-05-04 RX ADMIN — Medication 975 MILLIGRAM(S): at 22:00

## 2018-05-04 RX ADMIN — Medication 1 TABLET(S): at 14:44

## 2018-05-04 RX ADMIN — SIMETHICONE 80 MILLIGRAM(S): 80 TABLET, CHEWABLE ORAL at 06:33

## 2018-05-04 RX ADMIN — Medication 975 MILLIGRAM(S): at 14:39

## 2018-05-04 NOTE — PROGRESS NOTE ADULT - SUBJECTIVE AND OBJECTIVE BOX
Post-Operative Note, C/S  She is a  38y woman who is now post-operative day: 2    Subjective:  The patient feels well.  She is ambulating.   She is tolerating regular diet.  She denies nausea and vomiting; denies fever.  She is voiding.  Her pain is controlled; incisional pain is appropriate.  She reports normal postpartum bleeding.      Physical exam:    Vital Signs Last 24 Hrs  T(C): 36.8 (04 May 2018 05:59), Max: 37.6 (03 May 2018 13:06)  T(F): 98.2 (04 May 2018 05:59), Max: 99.6 (03 May 2018 13:06)  HR: 73 (04 May 2018 05:59) (72 - 82)  BP: 118/66 (04 May 2018 05:59) (108/63 - 118/66)  BP(mean): --  RR: 16 (04 May 2018 05:59) (16 - 19)  SpO2: 100% (04 May 2018 05:59) (99% - 100%)    Gen: NAD  Breast: Soft, nontender, not engorged.  Abdomen: Soft, nontender, no distension , firm uterine fundus at umbilicus.  Incision: C/D/I.  Pelvic: Normal lochia noted  Ext: No calf tenderness    LABS:                        9.6    16.38 )-----------( 288      ( 03 May 2018 08:08 )             30.7       Rubella status:     Allergies    No Known Allergies          MEDICATIONS  (STANDING):  acetaminophen   Tablet. 975 milliGRAM(s) Oral every 6 hours  diphtheria/tetanus/pertussis (acellular) Vaccine (ADAcel) 0.5 milliLiter(s) IntraMuscular once  ferrous    sulfate 325 milliGRAM(s) Oral daily  heparin  Injectable 5000 Unit(s) SubCutaneous every 12 hours  ibuprofen  Tablet 600 milliGRAM(s) Oral every 6 hours  oxyCODONE    IR 5 milliGRAM(s) Oral every 3 hours  prenatal multivitamin 1 Tablet(s) Oral daily    MEDICATIONS  (PRN):  diphenhydrAMINE   Capsule 25 milliGRAM(s) Oral every 6 hours PRN Itching  docusate sodium 100 milliGRAM(s) Oral two times a day PRN Stool Softening  glycerin Suppository - Adult 1 Suppository(s) Rectal at bedtime PRN Constipation  lanolin Ointment 1 Application(s) Topical every 3 hours PRN Sore Nipples  oxyCODONE    IR 5 milliGRAM(s) Oral every 4 hours PRN Severe Pain (7 - 10)  simethicone 80 milliGRAM(s) Chew every 4 hours PRN Gas

## 2018-05-05 VITALS
HEART RATE: 75 BPM | DIASTOLIC BLOOD PRESSURE: 71 MMHG | SYSTOLIC BLOOD PRESSURE: 133 MMHG | OXYGEN SATURATION: 98 % | RESPIRATION RATE: 18 BRPM | TEMPERATURE: 98 F

## 2018-05-05 RX ORDER — IBUPROFEN 200 MG
1 TABLET ORAL
Qty: 0 | Refills: 0 | DISCHARGE
Start: 2018-05-05

## 2018-05-05 RX ORDER — ACETAMINOPHEN 500 MG
3 TABLET ORAL
Qty: 0 | Refills: 0 | DISCHARGE
Start: 2018-05-05

## 2018-05-05 RX ADMIN — Medication 325 MILLIGRAM(S): at 10:49

## 2018-05-05 RX ADMIN — Medication 975 MILLIGRAM(S): at 05:00

## 2018-05-05 RX ADMIN — Medication 600 MILLIGRAM(S): at 11:45

## 2018-05-05 RX ADMIN — Medication 975 MILLIGRAM(S): at 10:49

## 2018-05-05 RX ADMIN — HEPARIN SODIUM 5000 UNIT(S): 5000 INJECTION INTRAVENOUS; SUBCUTANEOUS at 04:05

## 2018-05-05 RX ADMIN — Medication 600 MILLIGRAM(S): at 04:05

## 2018-05-05 RX ADMIN — Medication 1 TABLET(S): at 10:49

## 2018-05-05 RX ADMIN — Medication 975 MILLIGRAM(S): at 04:05

## 2018-05-05 RX ADMIN — Medication 600 MILLIGRAM(S): at 10:49

## 2018-05-05 RX ADMIN — Medication 975 MILLIGRAM(S): at 11:45

## 2018-05-05 RX ADMIN — Medication 600 MILLIGRAM(S): at 05:00

## 2018-05-05 NOTE — PROGRESS NOTE ADULT - SUBJECTIVE AND OBJECTIVE BOX
Patient seen and examined at bedside, no acute overnight events. No acute complaints, pain well controlled. Patient is ambulating and tolerating regular diet. Has not yet passed flatus. Barrientos is still in place. Pt is bottle feeding her baby.    Vital Signs Last 24 Hours  T(C): 36.9 (05-05-18 @ 06:17), Max: 36.9 (05-05-18 @ 06:17)  HR: 75 (05-05-18 @ 06:17) (75 - 79)  BP: 133/71 (05-05-18 @ 06:17) (125/57 - 133/71)  RR: 18 (05-05-18 @ 06:17) (18 - 19)  SpO2: 98% (05-05-18 @ 06:17) (98% - 100%)    I&O's Summary      Physical Exam:  General: NAD  Abdomen: Soft, non-tender, non-distended, fundus firm  Incision: Pfannenstiel incision CDI, subcuticular suture closure   Pelvic: Lochia wnl    Labs:    Blood Type: B Positive  Antibody Screen: Negative  RPR: Negative               9.6    16.38 )-----------( 288      ( 05-03 @ 08:08 )             30.7                11.0   13.61 )-----------( 297      ( 05-02 @ 01:18 )             34.7         MEDICATIONS  (STANDING):  acetaminophen   Tablet. 975 milliGRAM(s) Oral every 6 hours  diphtheria/tetanus/pertussis (acellular) Vaccine (ADAcel) 0.5 milliLiter(s) IntraMuscular once  ferrous    sulfate 325 milliGRAM(s) Oral daily  heparin  Injectable 5000 Unit(s) SubCutaneous every 12 hours  ibuprofen  Tablet 600 milliGRAM(s) Oral every 6 hours  oxyCODONE    IR 5 milliGRAM(s) Oral every 3 hours  prenatal multivitamin 1 Tablet(s) Oral daily    MEDICATIONS  (PRN):  diphenhydrAMINE   Capsule 25 milliGRAM(s) Oral every 6 hours PRN Itching  docusate sodium 100 milliGRAM(s) Oral two times a day PRN Stool Softening  glycerin Suppository - Adult 1 Suppository(s) Rectal at bedtime PRN Constipation  lanolin Ointment 1 Application(s) Topical every 3 hours PRN Sore Nipples  oxyCODONE    IR 5 milliGRAM(s) Oral every 4 hours PRN Severe Pain (7 - 10)  simethicone 80 milliGRAM(s) Chew every 4 hours PRN Gas

## 2018-05-05 NOTE — PROGRESS NOTE ADULT - SUBJECTIVE AND OBJECTIVE BOX
Section/Postpartum  ZAKI WILSON is a  38y woman  ,    who is now post-operative day 3.  PAST MEDICAL & SURGICAL HISTORY:  Dislocation of shoulder region, left, initial encounter: 15 yrs old  Obese  H/O  section  History of closed shoulder dislocation  History of tonsillectomy: 7 yrs old    Subjective:  The patient feels well.  She is ambulating without difficulty.  She is tolerating PO.  She is voiding.  She denies nausea and vomiting.  Her pain is controlled.  She reports normal postpartum bleeding  She is breastfeeding    Physical exam:    Vital Signs Last 24 Hrs  T(C): 36.9 (05 May 2018 06:17), Max: 36.9 (05 May 2018 06:17)  T(F): 98.4 (05 May 2018 06:17), Max: 98.4 (05 May 2018 06:17)  HR: 75 (05 May 2018 06:17) (75 - 79)  BP: 133/71 (05 May 2018 06:17) (125/57 - 133/71)  BP(mean): --  RR: 18 (05 May 2018 06:17) (18 - 19)  SpO2: 98% (05 May 2018 06:17) (98% - 100%)    General Apperance: alert and oriented in no acute distress  Breast: Soft, nontender, non engorged  Abdomen: Soft, nontender, not distended,  Uterine fundus is firm and at umbilicus, BS(+), Flatus(+), Bowel Movement (+)  Incision: Clean, dry, and intact   Pelvic: Normal lochia noted  Ext: No calf tenderness, No edema or cyanosis  Lochia: not excessive    LABS:    Blood Type: Type + Screen (18 @ 01:11)    ABO Interpretation: B    Rh Interpretation: Positive    Antibody Screen: Negative      Rubella status:  Immune    Allergies    No Known Allergies    Intolerances        MEDICATIONS  (STANDING):  acetaminophen   Tablet. 975 milliGRAM(s) Oral every 6 hours  diphtheria/tetanus/pertussis (acellular) Vaccine (ADAcel) 0.5 milliLiter(s) IntraMuscular once  ferrous    sulfate 325 milliGRAM(s) Oral daily  heparin  Injectable 5000 Unit(s) SubCutaneous every 12 hours  ibuprofen  Tablet 600 milliGRAM(s) Oral every 6 hours  oxyCODONE    IR 5 milliGRAM(s) Oral every 3 hours  prenatal multivitamin 1 Tablet(s) Oral daily    MEDICATIONS  (PRN):  diphenhydrAMINE   Capsule 25 milliGRAM(s) Oral every 6 hours PRN Itching  docusate sodium 100 milliGRAM(s) Oral two times a day PRN Stool Softening  glycerin Suppository - Adult 1 Suppository(s) Rectal at bedtime PRN Constipation  lanolin Ointment 1 Application(s) Topical every 3 hours PRN Sore Nipples  oxyCODONE    IR 5 milliGRAM(s) Oral every 4 hours PRN Severe Pain (7 - 10)  simethicone 80 milliGRAM(s) Chew every 4 hours PRN Gas        Assessment and Plan  POD # 3  S/P RCS +BTL       Doing well.  Encourage ambulation.  Discharge home today.  Paient to take ibuprofen and/or Tylenol for pain  Follow up in 1-2 week(s) for incision check, ZAKI WILSON is to call the office for an appointment.  To use abdominal binder while awake as needed  Verbal discharge instructions d/w patient  - discharge summary to be given to her on discharge for the hospital.  Call for fevers, chills, nausea, vomiting, heavy vaginal bleeding, vaginal discharge, severe pain, symptoms of depression, problems with incision or any other concerning symptoms.  Nothing in vagina and no heavy lifting for  6-8 weeks. No sex!  No driving x 2 weeks, Do not drive if you are taking narcotics.  Patient to continue with prenatal vitamins.

## 2018-05-05 NOTE — PROGRESS NOTE ADULT - PROBLEM SELECTOR PLAN 1
- Continue with po analgesia  - Increase ambulation  - Continue regular diet  - d/c IV fluids  - Check CBC  - D/c Barrientos  - Incision dressing removed    BOO Hurt PGY1

## 2018-05-09 LAB — SURGICAL PATHOLOGY STUDY: SIGNIFICANT CHANGE UP

## 2019-10-02 NOTE — PROGRESS NOTE ADULT - PROBLEM/PLAN-1
PHYSICAL THERAPY Treatment:    Date: 10/2/2019  Recorded diagnosis/complaint at time of admission:  There are no active hospital problems to display for this patient.    Co-morbidities:   Patient Active Problem List   Diagnosis   • Hyperlipidemia   • CAD (coronary artery disease)   • HTN (hypertension)   • Glaucoma   • Diabetes mellitus, type 2 (CMS/[facility])   • Closed fracture of shaft of humerus   • Cracked tooth   • Closed nondisplaced fracture of posterior column of acetabulum (CMS/[facility])   • Osteoporosis   • Depression with anxiety   • Vitamin D deficiency   • Nondisplaced fracture of posterior column of right acetabulum (CMS/[facility])   • Left shoulder pain   • Diabetes mellitus type 2 without retinopathy (CMS/[facility])   • Nuclear senile cataract of both eyes   • Insufficiency of tear film of both eyes   • Status post reverse total shoulder replacement, left   • CKD (chronic kidney disease) stage 3, GFR 30-59 ml/min (CMS/[facility])   • Anterior shoulder dislocation, left, initial encounter   • Tremor     Clinical Presentation: evolving clinical presentation with changing characteristics   Treatment Diagnosis: Impaired Posture, Impaired Joint Mobility, Impaired Gait/Locomotion Deficits, Impaired Mobility and Impaired Balance    Therapy Precautions:  not applicable    Activity: As tolerated    Cognition: Alert and Sullivan x3      SUBJECTIVE:   Pt. Reported agreeable to therapy with significant encouragement.    Pt's personal goal for therapy: return home with assistance from daughter and multiple family. Adamantly opposed to PATRICIA despite education that PATRICIA is writer's current recommendation given functional deficits.    Pain:  None Reported     OBSERVATION:   Pt is utilizing IV, Wagner, Pulse Ox, Blood Pressure Cuff, Telemetry, Central Line and SCDs  Skin Integrity: not formally assessed; defer to RN documentation  Edema: not formally assessed  Collaboration with: Nurse Hayes    Vital Signs: heart rate 145 bpm with activity  sinus tach, 100s resting    ROM (degrees):  UE: within functional limits, no limitations noted during mobility completion  LE: within functional limits , no limitations noted during mobility completion    Strength (out of 5 in available AROM):  UE: within functional limits , no limitations noted during mobility completion  LE: generalized weakness; requires assist for LE management during bed mobility tasks  Abdominal: limited as observed functionally with bed mobility    Neurological:  Coordination: intact grossly for mobility assessed    Balance:   Static Sitting: intact  Static Standing: impaired Uri with bilateral UE support on WW    Outcome Measure:   Not addressed this session.       MOBILITY:    Bed:   Supine to Sit: Minimal Assistance (min A)  Reason for Assistance: requires increased time to complete, safety due to line management, weakness, verbal cues for sequencing and technique, manual cues for LE management     Transfers:   Device Used: gait belt, 2 wheeled walker  Sit to Stand: Stand By Assistance (SBA)  Stand to Sit: Stand By Assistance (SBA)  Reason for Assistance: requires increased time to complete, safety due to line manangement, weakness, verbal cues for proper/safe hand placements and technique     Ambulation:   Assistance Level: Stand By Assistance (SBA)  Distance: 80 feet  Device Used: gait belt and 2 wheeled walker  Gait Mechanics: step through pattern, decreased step length bilateral, shuffling, decreased roshan  Reason for Assistance: requires increased time to complete, verbal cues for activity pacing, fatigue      Stairs:   Not addressed this session    Exercises:  Repetitions: 10  Sets: 1  Sitting: Knee Extension, Hip Flexion, Ankle Pumps    Activity Tolerance: limited by self     GOALS:     Rehab potential is good due to positive factors family support, activity tolerance and negative factors age, motivation level, co-morbidities    Review Date: 10/4/2019  1. Patient will complete  HEP with Alger.  Progressing toward  2. Patient will complete bed mobility with Modified Alger (mod I).  Progressing toward  3. Patient will complete sit to/from stand with Modified Alger (mod I) and gait belt.   Progressing toward  4. Patient will ambulate 300 feet with Modified Alger (mod I) and no assistive device.   Progressing toward  5. Patient will negotiate 1 stairs with Modified Alger (mod I) and no assistive device.   Goal not met     PLAN OF CARE:    PT Frequency: 6 days/week  Duration: LOS  Treatment/Interventions: Functional transfer training, Strengthening, Endurance training, Patient/Family training, Equipment eval/education, Gait training, Bed mobility, Continued evaluation, Stairs retraining, Safety Education, Neuromuscular re-education     RECOMMENDATIONS/EDUCATION:    Learner: patient  Readiness to Learn: acceptance  Learning Method: Verbal  Barriers to Learning: no barriers apparent at this time  Learning Evaluation: Verbalizes understanding, Demonstrates understanding and Needs reinforcement  Teaching Content: Energy Conservation, Positioning, Safety Awareness and Functional Mobility  Please reference the patient education activity for further information regarding the patient's learning assessment.  Equipment for discharge: to be determined - continuing to assess needs at this time       Order Status: no order placed        Delivery Status: not applicable, patient owns necessary equipment     See doc flowsheet (PT assess/treat/goals/charges) for specific information regarding time spent with patient.     Treatment Plan for Next Session: transfers, ambulation, therapeutic exercise      The plan of care and goals were established with the patient and she is in agreement.     ASSESSMENT:       Patient is displaying fair progress as evidenced by requiring less assistance for transfer completion and increased ambulation distance. Overall, she remains limited by a  lack of motivation.  At this time the patient continues to demonstrate decreased strength, balance deficits, diminished safety awareness, decreased activity tolerance, decreased endurance, generalized deconditioning which is limiting the completion of all functional mobility.  Further skilled physical therapy is required to address these limitations in attempt to maximize the patient's independence.    Recommendation for Discharge: PT: Sub-acute nursing home        Recommendations for Discharge: OT: Sub-acute nursing home           After today's session this therapist is recommending the above location for optimal discharge.  This recommendation is supported by the patient has had a prolonged hospital stay with an inability to make adequate progress towards her functional baseline. Pt would benefit from further strengthening and mobility training at [facility] to maximize safety and independence to facilitate return home.        DISPLAY PLAN FREE TEXT

## 2020-09-17 ENCOUNTER — TRANSCRIPTION ENCOUNTER (OUTPATIENT)
Age: 40
End: 2020-09-17

## 2020-09-17 ENCOUNTER — OUTPATIENT (OUTPATIENT)
Dept: OUTPATIENT SERVICES | Facility: HOSPITAL | Age: 40
LOS: 1 days | End: 2020-09-17
Payer: MEDICAID

## 2020-09-17 VITALS
DIASTOLIC BLOOD PRESSURE: 84 MMHG | TEMPERATURE: 99 F | RESPIRATION RATE: 14 BRPM | SYSTOLIC BLOOD PRESSURE: 132 MMHG | HEART RATE: 74 BPM | OXYGEN SATURATION: 100 % | HEIGHT: 66 IN | WEIGHT: 201.94 LBS

## 2020-09-17 DIAGNOSIS — O02.1 MISSED ABORTION: ICD-10-CM

## 2020-09-17 DIAGNOSIS — Z98.89 OTHER SPECIFIED POSTPROCEDURAL STATES: Chronic | ICD-10-CM

## 2020-09-17 DIAGNOSIS — Z87.39 PERSONAL HISTORY OF OTHER DISEASES OF THE MUSCULOSKELETAL SYSTEM AND CONNECTIVE TISSUE: Chronic | ICD-10-CM

## 2020-09-17 DIAGNOSIS — Z98.891 HISTORY OF UTERINE SCAR FROM PREVIOUS SURGERY: Chronic | ICD-10-CM

## 2020-09-17 DIAGNOSIS — Z01.818 ENCOUNTER FOR OTHER PREPROCEDURAL EXAMINATION: ICD-10-CM

## 2020-09-17 DIAGNOSIS — Z98.890 OTHER SPECIFIED POSTPROCEDURAL STATES: Chronic | ICD-10-CM

## 2020-09-17 LAB
HCG SERPL-ACNC: SIGNIFICANT CHANGE UP MIU/ML
HCT VFR BLD CALC: 37.1 % — SIGNIFICANT CHANGE UP (ref 34.5–45)
HGB BLD-MCNC: 12.4 G/DL — SIGNIFICANT CHANGE UP (ref 11.5–15.5)
MCHC RBC-ENTMCNC: 29.3 PG — SIGNIFICANT CHANGE UP (ref 27–34)
MCHC RBC-ENTMCNC: 33.4 GM/DL — SIGNIFICANT CHANGE UP (ref 32–36)
MCV RBC AUTO: 87.7 FL — SIGNIFICANT CHANGE UP (ref 80–100)
NRBC # BLD: 0 /100 WBCS — SIGNIFICANT CHANGE UP (ref 0–0)
PLATELET # BLD AUTO: 395 K/UL — SIGNIFICANT CHANGE UP (ref 150–400)
RBC # BLD: 4.23 M/UL — SIGNIFICANT CHANGE UP (ref 3.8–5.2)
RBC # FLD: 12.2 % — SIGNIFICANT CHANGE UP (ref 10.3–14.5)
SARS-COV-2 RNA SPEC QL NAA+PROBE: SIGNIFICANT CHANGE UP
WBC # BLD: 8.57 K/UL — SIGNIFICANT CHANGE UP (ref 3.8–10.5)
WBC # FLD AUTO: 8.57 K/UL — SIGNIFICANT CHANGE UP (ref 3.8–10.5)

## 2020-09-17 PROCEDURE — 36415 COLL VENOUS BLD VENIPUNCTURE: CPT

## 2020-09-17 PROCEDURE — 86901 BLOOD TYPING SEROLOGIC RH(D): CPT

## 2020-09-17 PROCEDURE — 84702 CHORIONIC GONADOTROPIN TEST: CPT

## 2020-09-17 PROCEDURE — 86900 BLOOD TYPING SEROLOGIC ABO: CPT

## 2020-09-17 PROCEDURE — 86850 RBC ANTIBODY SCREEN: CPT

## 2020-09-17 PROCEDURE — U0003: CPT

## 2020-09-17 PROCEDURE — 85027 COMPLETE CBC AUTOMATED: CPT

## 2020-09-17 PROCEDURE — G0463: CPT

## 2020-09-17 RX ORDER — SODIUM CHLORIDE 9 MG/ML
1000 INJECTION, SOLUTION INTRAVENOUS
Refills: 0 | Status: DISCONTINUED | OUTPATIENT
Start: 2020-09-18 | End: 2020-10-02

## 2020-09-17 NOTE — H&P PST ADULT - NEGATIVE ENMT SYMPTOMS
no vertigo/no hearing difficulty/no dysphagia/no tinnitus/no throat pain/no sinus symptoms/no nasal congestion/no post-nasal discharge/no abnormal taste sensation

## 2020-09-17 NOTE — H&P PST ADULT - NSICDXPASTMEDICALHX_GEN_ALL_CORE_FT
PAST MEDICAL HISTORY:  Dislocation of shoulder region, left, initial encounter 15 yrs old    Missed      Obese

## 2020-09-17 NOTE — H&P PST ADULT - NSICDXPASTSURGICALHX_GEN_ALL_CORE_FT
PAST SURGICAL HISTORY:  H/O  section X2    H/O dilation and curettage     H/O hernia repair Umbilical    History of closed shoulder dislocation     History of tonsillectomy 7 yrs old

## 2020-09-17 NOTE — H&P PST ADULT - NSICDXNOFAMILYHX_GEN_ALL_CORE
Pt sent from [facility] for abnormal INR >8. Recently in ICU diagnosed w/ klebsiella sepsis. Presents w/ epigastic pain radiating to Left shoulder 2/10.
<-- Click to add NO pertinent Family History

## 2020-09-17 NOTE — ASU PATIENT PROFILE, ADULT - AS SC BRADEN ACTIVITY
----- Message from Rachna Swain sent at 8/20/2019 10:51 AM EDT -----  Regarding: /Telephone   General Message/Vendor Calls    Caller's first and last name:Stephen with [address]       Reason for call: [facility] requesting a call back regarding status of Rx for \"Foot bath\" fax on 08/14/19. Pt stated this is his 3rd attempt for the Rx.       Callback required yes/no and why:Yes      Best contact number(s):[phone] or Fax:[phone]      Details to clarify the request:      Rachna Swain (2) walks frequently

## 2020-09-17 NOTE — H&P PST ADULT - OPHTHALMOLOGIC
Type Of Destruction Used: Curettage Curettage Text: The wound bed was treated with curettage after the biopsy was performed. Post-Care Instructions: I reviewed with the patient in detail post-care instructions. Patient is to keep the biopsy site dry overnight, and then apply bacitracin twice daily until healed. Patient may apply hydrogen peroxide soaks to remove any crusting. Additional Anesthesia Type: 1% lidocaine with 1:100,000 epinephrine Additional Anesthesia Volume In Cc (Will Not Render If 0): 0 Destruction After The Procedure: No Anesthesia Type: 1% lidocaine with epinephrine Anesthesia Volume In Cc (Will Not Render If 0): 2 Biopsy Method: Personna blade Biopsy Type: H and E Silver Nitrate Text: The wound bed was treated with silver nitrate after the biopsy was performed. Lab Facility: [id number] Consent: Written consent was obtained and risks were reviewed including but not limited to scarring, infection, bleeding, scabbing, incomplete removal, nerve damage and allergy to anesthesia. Lab: [id number] Billing Type: Third-Party Bill Dressing: bandage Wound Care: Petrolatum Hemostasis: Drysol Was A Bandage Applied: Yes Electrodesiccation And Curettage Text: The wound bed was treated with electrodesiccation and curettage after the biopsy was performed. Cryotherapy Text: The wound bed was treated with cryotherapy after the biopsy was performed. Electrodesiccation Text: The wound bed was treated with electrodesiccation after the biopsy was performed. Notification Instructions: Patient will be notified of biopsy results. However, patient instructed to call the office if not contacted within 2 weeks. Detail Level: Detailed negative

## 2020-09-17 NOTE — H&P PST ADULT - NSICDXPROBLEM_GEN_ALL_CORE_FT
PROBLEM DIAGNOSES  Problem: Missed   Assessment and Plan: PST Labs; CBC, cG, Type & Screen, COVID PCR swab. No medical clearance needed.  Pt instructed to stop her Ibuprofen between now and procedure. May take Tylenol if needed for pain between now and procedure. Pre-op instructions given to pt with understanding verbalized.

## 2020-09-17 NOTE — H&P PST ADULT - HISTORY OF PRESENT ILLNESS
40 year old female  (missed AB x 3) no significant PMH - now with Missed  presents for PST prior to  Suction Dilation & Curettage Miss With Karotyping with Dr Anette Greco on 2020. Pt notes she was almost 8 weeks pregnant and when she went for check up this week on 9/15/2020 "there was no heartbat." Following discussions with Dr Greco pt is electing for scheduled procedure. 40 year old female  (Missed AB X 3) no significant PMH - now with Missed  presents for PST prior to Suction Dilation & Curettage MISS with Karotyping with Dr Anette Greco on 2020. Pt notes LMP was 20 stating she was just shy of 8 weeks into pregnancy and there was no fetal heartbeat heard with exam done on 9/15/2020. Following discussions with Dr Greco pt is electing for scheduled procedure.

## 2020-09-17 NOTE — H&P PST ADULT - GENERAL COMMENTS
Denies any recent travel within the last 14 days - denies any exposure to anyone with known COVID - denies COVID symptoms

## 2020-09-17 NOTE — H&P PST ADULT - ASSESSMENT
40 year old female no significant PMH - now with Missed  presents for PST prior to Suction Dilation & Curettage MISS with Karotyping with Dr Anette Greco on 2020

## 2020-09-17 NOTE — ASU PATIENT PROFILE, ADULT - PSH
H/O  section  X2  H/O dilation and curettage    H/O hernia repair  Umbilical  History of closed shoulder dislocation    History of tonsillectomy  7 yrs old

## 2020-09-18 ENCOUNTER — RESULT REVIEW (OUTPATIENT)
Age: 40
End: 2020-09-18

## 2020-09-18 ENCOUNTER — OUTPATIENT (OUTPATIENT)
Dept: OUTPATIENT SERVICES | Facility: HOSPITAL | Age: 40
LOS: 1 days | End: 2020-09-18
Payer: MEDICAID

## 2020-09-18 VITALS
HEART RATE: 60 BPM | WEIGHT: 203.93 LBS | OXYGEN SATURATION: 100 % | RESPIRATION RATE: 14 BRPM | HEIGHT: 66 IN | DIASTOLIC BLOOD PRESSURE: 68 MMHG | TEMPERATURE: 98 F | SYSTOLIC BLOOD PRESSURE: 138 MMHG

## 2020-09-18 VITALS
SYSTOLIC BLOOD PRESSURE: 110 MMHG | OXYGEN SATURATION: 100 % | RESPIRATION RATE: 17 BRPM | DIASTOLIC BLOOD PRESSURE: 66 MMHG | HEART RATE: 60 BPM

## 2020-09-18 DIAGNOSIS — O02.1 MISSED ABORTION: ICD-10-CM

## 2020-09-18 DIAGNOSIS — Z98.890 OTHER SPECIFIED POSTPROCEDURAL STATES: Chronic | ICD-10-CM

## 2020-09-18 DIAGNOSIS — Z98.891 HISTORY OF UTERINE SCAR FROM PREVIOUS SURGERY: Chronic | ICD-10-CM

## 2020-09-18 DIAGNOSIS — Z98.89 OTHER SPECIFIED POSTPROCEDURAL STATES: Chronic | ICD-10-CM

## 2020-09-18 DIAGNOSIS — Z87.39 PERSONAL HISTORY OF OTHER DISEASES OF THE MUSCULOSKELETAL SYSTEM AND CONNECTIVE TISSUE: Chronic | ICD-10-CM

## 2020-09-18 LAB
HCT VFR BLD CALC: 41.3 % — SIGNIFICANT CHANGE UP (ref 34.5–45)
HGB BLD-MCNC: 13.5 G/DL — SIGNIFICANT CHANGE UP (ref 11.5–15.5)
MCHC RBC-ENTMCNC: 28.7 PG — SIGNIFICANT CHANGE UP (ref 27–34)
MCHC RBC-ENTMCNC: 32.7 GM/DL — SIGNIFICANT CHANGE UP (ref 32–36)
MCV RBC AUTO: 87.9 FL — SIGNIFICANT CHANGE UP (ref 80–100)
NRBC # BLD: 0 /100 WBCS — SIGNIFICANT CHANGE UP (ref 0–0)
PLATELET # BLD AUTO: 396 K/UL — SIGNIFICANT CHANGE UP (ref 150–400)
RBC # BLD: 4.7 M/UL — SIGNIFICANT CHANGE UP (ref 3.8–5.2)
RBC # FLD: 12.2 % — SIGNIFICANT CHANGE UP (ref 10.3–14.5)
WBC # BLD: 7.51 K/UL — SIGNIFICANT CHANGE UP (ref 3.8–10.5)
WBC # FLD AUTO: 7.51 K/UL — SIGNIFICANT CHANGE UP (ref 3.8–10.5)

## 2020-09-18 PROCEDURE — 36415 COLL VENOUS BLD VENIPUNCTURE: CPT

## 2020-09-18 PROCEDURE — 88305 TISSUE EXAM BY PATHOLOGIST: CPT | Mod: 26,59

## 2020-09-18 PROCEDURE — 85027 COMPLETE CBC AUTOMATED: CPT

## 2020-09-18 PROCEDURE — 88233 TISSUE CULTURE SKIN/BIOPSY: CPT

## 2020-09-18 PROCEDURE — 88267 CHROMOSOME ANALYS PLACENTA: CPT

## 2020-09-18 PROCEDURE — 88264 CHROMOSOME ANALYSIS 20-25: CPT

## 2020-09-18 PROCEDURE — 59820 CARE OF MISCARRIAGE: CPT

## 2020-09-18 PROCEDURE — 88300 SURGICAL PATH GROSS: CPT

## 2020-09-18 PROCEDURE — 88300 SURGICAL PATH GROSS: CPT | Mod: 26,59

## 2020-09-18 PROCEDURE — 88305 TISSUE EXAM BY PATHOLOGIST: CPT

## 2020-09-18 RX ORDER — IBUPROFEN 200 MG
1 TABLET ORAL
Qty: 0 | Refills: 0 | DISCHARGE
Start: 2020-09-18

## 2020-09-18 RX ORDER — SODIUM CHLORIDE 9 MG/ML
1000 INJECTION, SOLUTION INTRAVENOUS
Refills: 0 | Status: DISCONTINUED | OUTPATIENT
Start: 2020-09-18 | End: 2020-09-18

## 2020-09-18 RX ADMIN — SODIUM CHLORIDE 75 MILLILITER(S): 9 INJECTION, SOLUTION INTRAVENOUS at 11:18

## 2020-09-18 RX ADMIN — SODIUM CHLORIDE 75 MILLILITER(S): 9 INJECTION, SOLUTION INTRAVENOUS at 09:38

## 2020-09-18 NOTE — ASU DISCHARGE PLAN (ADULT/PEDIATRIC) - CARE PROVIDER_API CALL
Anette Greco  OBS-GYN - GENERAL  [address] [place] [address], [address]  [place] [address]  Phone: [phone]  Fax: [phone]  Follow Up Time:

## 2020-09-18 NOTE — ASU DISCHARGE PLAN (ADULT/PEDIATRIC) - ASU DC SPECIAL INSTRUCTIONSFT
Call the office to schedule a post-operative appointment in 2 weeks.     No intercourse/douching/tampons for 2 weeks. Avoid strenuous activity, exercise, heavy lifting for two weeks. You may shower freely, but do not soak in the tub or swim. Eat according to your appetite. Please follow all written and verbal instructions, and call us if you are having any acute problems, including (but not limited to) excessive vaginal bleeding (soaking pads), fever >100 degrees, persistent nausea or vomiting.

## 2020-09-18 NOTE — BRIEF OPERATIVE NOTE - NSICDXBRIEFPROCEDURE_GEN_ALL_CORE_FT
PROCEDURES:  Dilation and curettage, uterus, using suction, for missed first trimester  18-Sep-2020 10:05:46  Anette Greco

## 2021-08-19 NOTE — ASU PREOP CHECKLIST - TYPE OF SOLUTION
Detail Level: Detailed Add 91606 Cpt? (Important Note: In 2017 The Use Of 35847 Is Being Tracked By Cms To Determine Future Global Period Reimbursement For Global Periods): yes LR

## 2022-04-27 ENCOUNTER — FORM ENCOUNTER (OUTPATIENT)
Age: 42
End: 2022-04-27

## 2022-09-06 NOTE — PATIENT PROFILE OB - NSSUBSTANCEUSE_GEN_ALL_CORE_SD
Labor Progress Note  Patient seen, fetal heart rate and contraction pattern evaluated, patient examined. No data found. Physical Exam:  Cervical Exam:  C/C/0  Membranes:   s/p AROM  Uterine Activity: q2-3 min  Fetal Heart Rate: 150s/mod latha/+accels/no decels    Assessment/Plan:  Cat I tracing. Set up to push.      Rosa Hernandez MD  9/6/2022  1:41 PM never used

## 2023-03-01 NOTE — ASU DISCHARGE PLAN (ADULT/PEDIATRIC) - PAIN MANAGEMENT
Take over the counter pain medication Quality 130: Documentation Of Current Medications In The Medical Record: Current Medications Documented Quality 110: Preventive Care And Screening: Influenza Immunization: Influenza Immunization previously received during influenza season Quality 431: Preventive Care And Screening: Unhealthy Alcohol Use - Screening: Patient not screened for unhealthy alcohol use using a systematic screening method Detail Level: Simple

## 2023-06-15 ENCOUNTER — APPOINTMENT (OUTPATIENT)
Dept: ORTHOPEDIC SURGERY | Facility: CLINIC | Age: 43
End: 2023-06-15

## 2023-06-15 ENCOUNTER — RESULT CHARGE (OUTPATIENT)
Age: 43
End: 2023-06-15

## 2023-10-18 NOTE — ASU PREOP CHECKLIST - BLOOD AVAILABLE
n/a

## 2024-03-11 NOTE — H&P PST ADULT - CARDIOVASCULAR
Head,  normocephalic,  atraumatic,  Face,  Face within normal limits,  Ears,  External ears within normal limits,  Nose/Nasopharynx,  External nose  normal appearance Mouth and Throat,  Oral cavity appearance normal Lips,  Appearance normal details… detailed exam

## 2024-08-12 NOTE — ASU PREOP CHECKLIST - SITE MARKED BY ANESTHESIOLOGIST
How Many Skin Cancers Have You Had?: more than one What Is The Reason For Today's Visit?: History of Non-Melanoma Skin Cancer When Was Your Last Cancer Diagnosed?: 2024 n/a

## 2025-05-30 NOTE — H&P PST ADULT - NS SC CAGE ALCOHOL ANNOYED YOU
PHYSICAL / OCCUPATIONAL THERAPY - DAILY TREATMENT NOTE     Patient Name: Daja Pearl    Date: 2025    : 1944  Insurance: Payor: [employer] MEDICARE / Plan: [employer] CHOICE-PPO MEDICARE / Product Type: *No Product type* /      Patient  verified Yes     Visit #   Current / Total 7 36   Time   In / Out 1230 110   Pain   In / Out 0 0   Subjective Functional Status/Changes: Patient complains of no pain in Left Shoulder today.   Changes to:  Allergies, Med Hx, Sx Hx?   no       TREATMENT AREA =  Other specified postprocedural states [Z98.890]  Pain in left shoulder [M25.512]    If an interpreting service is utilized for treatment of this patient, the contents of this document represent the material reviewed with the patient via the .     OBJECTIVE          Therapeutic Procedures:  Tx Min Billable or 1:1 Min (if diff from Tx Min) Procedure, Rationale, Specifics   40  76608 Therapeutic Exercise (timed):  increase ROM, strength, coordination, balance, and proprioception to improve patient's ability to progress to PLOF and address remaining functional goals. (see flow sheet as applicable)    Details if applicable:           Details if applicable:         Details if applicable:           Details if applicable:            Details if applicable:     40  MC BC Totals Reminder: bill using total billable min of TIMED therapeutic procedures (example: do not include dry needle or estim unattended, both untimed codes, in totals to left)  8-22 min = 1 unit; 23-37 min = 2 units; 38-52 min = 3 units; 53-67 min = 4 units; 68-82 min = 5 units   Total Total     TOTAL TREATMENT TIME:        40     Charge Capture    [x]  Patient Education billed concurrently with other procedures   [x] Review HEP    [] Progressed/Changed HEP, detail:    [] Other detail:       Objective Information/Functional Measures/Assessment  Patient progressed to Pulley( AAROM) for Flex/ Scaption for 2x 2 min each. Patient reports that she is 
no